# Patient Record
Sex: MALE | NOT HISPANIC OR LATINO
[De-identification: names, ages, dates, MRNs, and addresses within clinical notes are randomized per-mention and may not be internally consistent; named-entity substitution may affect disease eponyms.]

---

## 2022-01-12 PROBLEM — Z00.00 ENCOUNTER FOR PREVENTIVE HEALTH EXAMINATION: Status: ACTIVE | Noted: 2022-01-12

## 2023-09-27 ENCOUNTER — LABORATORY RESULT (OUTPATIENT)
Age: 88
End: 2023-09-27

## 2023-09-27 ENCOUNTER — APPOINTMENT (OUTPATIENT)
Dept: FAMILY MEDICINE | Facility: CLINIC | Age: 88
End: 2023-09-27
Payer: MEDICARE

## 2023-09-27 ENCOUNTER — NON-APPOINTMENT (OUTPATIENT)
Age: 88
End: 2023-09-27

## 2023-09-27 VITALS
OXYGEN SATURATION: 87 % | HEART RATE: 79 BPM | TEMPERATURE: 97.6 F | BODY MASS INDEX: 30.31 KG/M2 | WEIGHT: 200 LBS | HEIGHT: 68 IN

## 2023-09-27 DIAGNOSIS — Z13.0 ENCOUNTER FOR SCREENING FOR OTHER SUSPECTED ENDOCRINE DISORDER: ICD-10-CM

## 2023-09-27 DIAGNOSIS — Z13.21 ENCOUNTER FOR SCREENING FOR OTHER SUSPECTED ENDOCRINE DISORDER: ICD-10-CM

## 2023-09-27 DIAGNOSIS — I50.9 HEART FAILURE, UNSPECIFIED: ICD-10-CM

## 2023-09-27 DIAGNOSIS — Z13.29 ENCOUNTER FOR SCREENING FOR OTHER SUSPECTED ENDOCRINE DISORDER: ICD-10-CM

## 2023-09-27 DIAGNOSIS — Z12.5 ENCOUNTER FOR SCREENING FOR MALIGNANT NEOPLASM OF PROSTATE: ICD-10-CM

## 2023-09-27 DIAGNOSIS — Z98.890 OTHER SPECIFIED POSTPROCEDURAL STATES: ICD-10-CM

## 2023-09-27 DIAGNOSIS — Z13.228 ENCOUNTER FOR SCREENING FOR OTHER SUSPECTED ENDOCRINE DISORDER: ICD-10-CM

## 2023-09-27 PROCEDURE — G0439: CPT

## 2023-09-27 PROCEDURE — 36415 COLL VENOUS BLD VENIPUNCTURE: CPT

## 2023-09-27 RX ORDER — TAMSULOSIN HYDROCHLORIDE 0.4 MG/1
0.4 CAPSULE ORAL
Qty: 90 | Refills: 0 | Status: ACTIVE | COMMUNITY
Start: 2023-09-27 | End: 1900-01-01

## 2023-09-27 RX ORDER — CLOPIDOGREL BISULFATE 75 MG/1
75 TABLET, FILM COATED ORAL DAILY
Qty: 90 | Refills: 0 | Status: ACTIVE | COMMUNITY
Start: 2023-09-27 | End: 1900-01-01

## 2023-09-27 RX ORDER — CHLORHEXIDINE GLUCONATE 4 %
325 (65 FE) LIQUID (ML) TOPICAL
Qty: 100 | Refills: 0 | Status: ACTIVE | COMMUNITY
Start: 2023-09-27 | End: 1900-01-01

## 2023-09-27 RX ORDER — POTASSIUM CHLORIDE 1500 MG/1
20 TABLET, FILM COATED, EXTENDED RELEASE ORAL
Qty: 90 | Refills: 0 | Status: ACTIVE | COMMUNITY
Start: 2023-09-27 | End: 1900-01-01

## 2023-09-27 RX ORDER — SIMVASTATIN 40 MG/1
40 TABLET, FILM COATED ORAL
Qty: 90 | Refills: 0 | Status: ACTIVE | COMMUNITY
Start: 2023-09-27 | End: 1900-01-01

## 2023-09-27 RX ORDER — FUROSEMIDE 40 MG/1
40 TABLET ORAL DAILY
Qty: 90 | Refills: 0 | Status: ACTIVE | COMMUNITY
Start: 2023-09-27 | End: 1900-01-01

## 2023-10-09 ENCOUNTER — NON-APPOINTMENT (OUTPATIENT)
Age: 88
End: 2023-10-09

## 2023-12-22 ENCOUNTER — RX RENEWAL (OUTPATIENT)
Age: 88
End: 2023-12-22

## 2023-12-28 ENCOUNTER — RX RENEWAL (OUTPATIENT)
Age: 88
End: 2023-12-28

## 2024-02-18 ENCOUNTER — INPATIENT (INPATIENT)
Facility: HOSPITAL | Age: 89
LOS: 0 days | DRG: 291 | End: 2024-02-19
Attending: STUDENT IN AN ORGANIZED HEALTH CARE EDUCATION/TRAINING PROGRAM | Admitting: STUDENT IN AN ORGANIZED HEALTH CARE EDUCATION/TRAINING PROGRAM
Payer: MEDICARE

## 2024-02-18 VITALS
HEIGHT: 65 IN | RESPIRATION RATE: 20 BRPM | WEIGHT: 195.11 LBS | DIASTOLIC BLOOD PRESSURE: 78 MMHG | TEMPERATURE: 98 F | HEART RATE: 77 BPM | OXYGEN SATURATION: 89 % | SYSTOLIC BLOOD PRESSURE: 131 MMHG

## 2024-02-18 DIAGNOSIS — I50.9 HEART FAILURE, UNSPECIFIED: ICD-10-CM

## 2024-02-18 LAB
ALBUMIN SERPL ELPH-MCNC: 3.4 G/DL — SIGNIFICANT CHANGE UP (ref 3.3–5.2)
ALP SERPL-CCNC: 82 U/L — SIGNIFICANT CHANGE UP (ref 40–120)
ALT FLD-CCNC: 13 U/L — SIGNIFICANT CHANGE UP
ANION GAP SERPL CALC-SCNC: 10 MMOL/L — SIGNIFICANT CHANGE UP (ref 5–17)
APPEARANCE UR: CLEAR — SIGNIFICANT CHANGE UP
APTT BLD: 32.6 SEC — SIGNIFICANT CHANGE UP (ref 24.5–35.6)
AST SERPL-CCNC: 21 U/L — SIGNIFICANT CHANGE UP
BACTERIA # UR AUTO: ABNORMAL /HPF
BASOPHILS # BLD AUTO: 0.03 K/UL — SIGNIFICANT CHANGE UP (ref 0–0.2)
BASOPHILS NFR BLD AUTO: 0.3 % — SIGNIFICANT CHANGE UP (ref 0–2)
BILIRUB SERPL-MCNC: 0.5 MG/DL — SIGNIFICANT CHANGE UP (ref 0.4–2)
BILIRUB UR-MCNC: NEGATIVE — SIGNIFICANT CHANGE UP
BUN SERPL-MCNC: 37 MG/DL — HIGH (ref 8–20)
CALCIUM SERPL-MCNC: 8.6 MG/DL — SIGNIFICANT CHANGE UP (ref 8.4–10.5)
CAST: 11 /LPF — HIGH (ref 0–4)
CHLORIDE SERPL-SCNC: 96 MMOL/L — SIGNIFICANT CHANGE UP (ref 96–108)
CO2 SERPL-SCNC: 37 MMOL/L — HIGH (ref 22–29)
COLOR SPEC: YELLOW — SIGNIFICANT CHANGE UP
CREAT SERPL-MCNC: 2.28 MG/DL — HIGH (ref 0.5–1.3)
DIFF PNL FLD: NEGATIVE — SIGNIFICANT CHANGE UP
EGFR: 26 ML/MIN/1.73M2 — LOW
EOSINOPHIL # BLD AUTO: 0.08 K/UL — SIGNIFICANT CHANGE UP (ref 0–0.5)
EOSINOPHIL NFR BLD AUTO: 0.8 % — SIGNIFICANT CHANGE UP (ref 0–6)
FLUAV AG NPH QL: SIGNIFICANT CHANGE UP
FLUBV AG NPH QL: SIGNIFICANT CHANGE UP
GAS PNL BLDA: SIGNIFICANT CHANGE UP
GAS PNL BLDA: SIGNIFICANT CHANGE UP
GLUCOSE SERPL-MCNC: 135 MG/DL — HIGH (ref 70–99)
GLUCOSE UR QL: NEGATIVE MG/DL — SIGNIFICANT CHANGE UP
HCT VFR BLD CALC: 31.8 % — LOW (ref 39–50)
HGB BLD-MCNC: 9.8 G/DL — LOW (ref 13–17)
IMM GRANULOCYTES NFR BLD AUTO: 0.4 % — SIGNIFICANT CHANGE UP (ref 0–0.9)
INR BLD: 1.16 RATIO — SIGNIFICANT CHANGE UP (ref 0.85–1.18)
KETONES UR-MCNC: NEGATIVE MG/DL — SIGNIFICANT CHANGE UP
LEUKOCYTE ESTERASE UR-ACNC: NEGATIVE — SIGNIFICANT CHANGE UP
LYMPHOCYTES # BLD AUTO: 1.75 K/UL — SIGNIFICANT CHANGE UP (ref 1–3.3)
LYMPHOCYTES # BLD AUTO: 17.8 % — SIGNIFICANT CHANGE UP (ref 13–44)
MAGNESIUM SERPL-MCNC: 1.9 MG/DL — SIGNIFICANT CHANGE UP (ref 1.6–2.6)
MCHC RBC-ENTMCNC: 30.1 PG — SIGNIFICANT CHANGE UP (ref 27–34)
MCHC RBC-ENTMCNC: 30.8 GM/DL — LOW (ref 32–36)
MCV RBC AUTO: 97.5 FL — SIGNIFICANT CHANGE UP (ref 80–100)
MONOCYTES # BLD AUTO: 0.79 K/UL — SIGNIFICANT CHANGE UP (ref 0–0.9)
MONOCYTES NFR BLD AUTO: 8 % — SIGNIFICANT CHANGE UP (ref 2–14)
NEUTROPHILS # BLD AUTO: 7.14 K/UL — SIGNIFICANT CHANGE UP (ref 1.8–7.4)
NEUTROPHILS NFR BLD AUTO: 72.7 % — SIGNIFICANT CHANGE UP (ref 43–77)
NITRITE UR-MCNC: NEGATIVE — SIGNIFICANT CHANGE UP
PH UR: 5.5 — SIGNIFICANT CHANGE UP (ref 5–8)
PHOSPHATE SERPL-MCNC: 4.5 MG/DL — SIGNIFICANT CHANGE UP (ref 2.4–4.7)
PLATELET # BLD AUTO: 227 K/UL — SIGNIFICANT CHANGE UP (ref 150–400)
POTASSIUM SERPL-MCNC: 3.8 MMOL/L — SIGNIFICANT CHANGE UP (ref 3.5–5.3)
POTASSIUM SERPL-SCNC: 3.8 MMOL/L — SIGNIFICANT CHANGE UP (ref 3.5–5.3)
PROCALCITONIN SERPL-MCNC: 0.1 NG/ML — SIGNIFICANT CHANGE UP (ref 0.02–0.1)
PROT SERPL-MCNC: 6.9 G/DL — SIGNIFICANT CHANGE UP (ref 6.6–8.7)
PROT UR-MCNC: 30 MG/DL
PROTHROM AB SERPL-ACNC: 12.8 SEC — SIGNIFICANT CHANGE UP (ref 9.5–13)
RBC # BLD: 3.26 M/UL — LOW (ref 4.2–5.8)
RBC # FLD: 12.5 % — SIGNIFICANT CHANGE UP (ref 10.3–14.5)
RBC CASTS # UR COMP ASSIST: 1 /HPF — SIGNIFICANT CHANGE UP (ref 0–4)
RSV RNA NPH QL NAA+NON-PROBE: SIGNIFICANT CHANGE UP
SARS-COV-2 RNA SPEC QL NAA+PROBE: SIGNIFICANT CHANGE UP
SODIUM SERPL-SCNC: 143 MMOL/L — SIGNIFICANT CHANGE UP (ref 135–145)
SP GR SPEC: 1.02 — SIGNIFICANT CHANGE UP (ref 1–1.03)
SQUAMOUS # UR AUTO: 2 /HPF — SIGNIFICANT CHANGE UP (ref 0–5)
TROPONIN T, HIGH SENSITIVITY RESULT: 71 NG/L — HIGH (ref 0–51)
TROPONIN T, HIGH SENSITIVITY RESULT: 72 NG/L — HIGH (ref 0–51)
UROBILINOGEN FLD QL: 1 MG/DL — SIGNIFICANT CHANGE UP (ref 0.2–1)
WBC # BLD: 9.83 K/UL — SIGNIFICANT CHANGE UP (ref 3.8–10.5)
WBC # FLD AUTO: 9.83 K/UL — SIGNIFICANT CHANGE UP (ref 3.8–10.5)
WBC UR QL: 0 /HPF — SIGNIFICANT CHANGE UP (ref 0–5)

## 2024-02-18 PROCEDURE — 93010 ELECTROCARDIOGRAM REPORT: CPT

## 2024-02-18 PROCEDURE — 71045 X-RAY EXAM CHEST 1 VIEW: CPT | Mod: 26

## 2024-02-18 PROCEDURE — 99291 CRITICAL CARE FIRST HOUR: CPT

## 2024-02-18 PROCEDURE — 99223 1ST HOSP IP/OBS HIGH 75: CPT

## 2024-02-18 RX ORDER — NITROGLYCERIN 6.5 MG
0.4 CAPSULE, EXTENDED RELEASE ORAL ONCE
Refills: 0 | Status: COMPLETED | OUTPATIENT
Start: 2024-02-18 | End: 2024-02-18

## 2024-02-18 RX ORDER — FINASTERIDE 5 MG/1
5 TABLET, FILM COATED ORAL DAILY
Refills: 0 | Status: DISCONTINUED | OUTPATIENT
Start: 2024-02-18 | End: 2024-02-19

## 2024-02-18 RX ORDER — ATORVASTATIN CALCIUM 80 MG/1
40 TABLET, FILM COATED ORAL AT BEDTIME
Refills: 0 | Status: DISCONTINUED | OUTPATIENT
Start: 2024-02-18 | End: 2024-02-19

## 2024-02-18 RX ORDER — ASPIRIN/CALCIUM CARB/MAGNESIUM 324 MG
81 TABLET ORAL DAILY
Refills: 0 | Status: DISCONTINUED | OUTPATIENT
Start: 2024-02-18 | End: 2024-02-19

## 2024-02-18 RX ORDER — ASPIRIN/CALCIUM CARB/MAGNESIUM 324 MG
162 TABLET ORAL ONCE
Refills: 0 | Status: COMPLETED | OUTPATIENT
Start: 2024-02-18 | End: 2024-02-18

## 2024-02-18 RX ORDER — CLOPIDOGREL BISULFATE 75 MG/1
75 TABLET, FILM COATED ORAL DAILY
Refills: 0 | Status: DISCONTINUED | OUTPATIENT
Start: 2024-02-18 | End: 2024-02-19

## 2024-02-18 RX ORDER — HEPARIN SODIUM 5000 [USP'U]/ML
5000 INJECTION INTRAVENOUS; SUBCUTANEOUS EVERY 8 HOURS
Refills: 0 | Status: DISCONTINUED | OUTPATIENT
Start: 2024-02-18 | End: 2024-02-19

## 2024-02-18 RX ORDER — AZITHROMYCIN 500 MG/1
500 TABLET, FILM COATED ORAL ONCE
Refills: 0 | Status: COMPLETED | OUTPATIENT
Start: 2024-02-18 | End: 2024-02-18

## 2024-02-18 RX ORDER — ACETAMINOPHEN 500 MG
650 TABLET ORAL EVERY 6 HOURS
Refills: 0 | Status: DISCONTINUED | OUTPATIENT
Start: 2024-02-18 | End: 2024-02-19

## 2024-02-18 RX ORDER — ONDANSETRON 8 MG/1
4 TABLET, FILM COATED ORAL EVERY 8 HOURS
Refills: 0 | Status: DISCONTINUED | OUTPATIENT
Start: 2024-02-18 | End: 2024-02-19

## 2024-02-18 RX ORDER — AZITHROMYCIN 500 MG/1
500 TABLET, FILM COATED ORAL EVERY 24 HOURS
Refills: 0 | Status: DISCONTINUED | OUTPATIENT
Start: 2024-02-18 | End: 2024-02-19

## 2024-02-18 RX ORDER — FERROUS SULFATE 325(65) MG
325 TABLET ORAL DAILY
Refills: 0 | Status: DISCONTINUED | OUTPATIENT
Start: 2024-02-18 | End: 2024-02-19

## 2024-02-18 RX ORDER — FUROSEMIDE 40 MG
40 TABLET ORAL ONCE
Refills: 0 | Status: COMPLETED | OUTPATIENT
Start: 2024-02-18 | End: 2024-02-18

## 2024-02-18 RX ORDER — INFLUENZA VIRUS VACCINE 15; 15; 15; 15 UG/.5ML; UG/.5ML; UG/.5ML; UG/.5ML
0.7 SUSPENSION INTRAMUSCULAR ONCE
Refills: 0 | Status: DISCONTINUED | OUTPATIENT
Start: 2024-02-18 | End: 2024-02-19

## 2024-02-18 RX ORDER — LANOLIN ALCOHOL/MO/W.PET/CERES
3 CREAM (GRAM) TOPICAL AT BEDTIME
Refills: 0 | Status: DISCONTINUED | OUTPATIENT
Start: 2024-02-18 | End: 2024-02-19

## 2024-02-18 RX ORDER — IPRATROPIUM/ALBUTEROL SULFATE 18-103MCG
3 AEROSOL WITH ADAPTER (GRAM) INHALATION ONCE
Refills: 0 | Status: COMPLETED | OUTPATIENT
Start: 2024-02-18 | End: 2024-02-18

## 2024-02-18 RX ORDER — TAMSULOSIN HYDROCHLORIDE 0.4 MG/1
0.4 CAPSULE ORAL AT BEDTIME
Refills: 0 | Status: DISCONTINUED | OUTPATIENT
Start: 2024-02-18 | End: 2024-02-19

## 2024-02-18 RX ORDER — BUDESONIDE, MICRONIZED 100 %
0.5 POWDER (GRAM) MISCELLANEOUS
Refills: 0 | Status: DISCONTINUED | OUTPATIENT
Start: 2024-02-18 | End: 2024-02-19

## 2024-02-18 RX ORDER — FUROSEMIDE 40 MG
40 TABLET ORAL
Refills: 0 | Status: DISCONTINUED | OUTPATIENT
Start: 2024-02-18 | End: 2024-02-19

## 2024-02-18 RX ORDER — IPRATROPIUM/ALBUTEROL SULFATE 18-103MCG
3 AEROSOL WITH ADAPTER (GRAM) INHALATION EVERY 6 HOURS
Refills: 0 | Status: DISCONTINUED | OUTPATIENT
Start: 2024-02-18 | End: 2024-02-19

## 2024-02-18 RX ADMIN — Medication 40 MILLIGRAM(S): at 12:47

## 2024-02-18 RX ADMIN — AZITHROMYCIN 255 MILLIGRAM(S): 500 TABLET, FILM COATED ORAL at 12:47

## 2024-02-18 RX ADMIN — Medication 40 MILLIGRAM(S): at 13:49

## 2024-02-18 RX ADMIN — Medication 3 MILLILITER(S): at 21:55

## 2024-02-18 RX ADMIN — Medication 162 MILLIGRAM(S): at 13:40

## 2024-02-18 RX ADMIN — HEPARIN SODIUM 5000 UNIT(S): 5000 INJECTION INTRAVENOUS; SUBCUTANEOUS at 21:51

## 2024-02-18 RX ADMIN — Medication 3 MILLILITER(S): at 13:40

## 2024-02-18 RX ADMIN — Medication 40 MILLIGRAM(S): at 21:52

## 2024-02-18 RX ADMIN — Medication 3 MILLILITER(S): at 12:47

## 2024-02-18 RX ADMIN — AZITHROMYCIN 255 MILLIGRAM(S): 500 TABLET, FILM COATED ORAL at 21:57

## 2024-02-18 RX ADMIN — TAMSULOSIN HYDROCHLORIDE 0.4 MILLIGRAM(S): 0.4 CAPSULE ORAL at 21:50

## 2024-02-18 RX ADMIN — Medication 0.4 MILLIGRAM(S): at 13:49

## 2024-02-18 NOTE — ED ADULT NURSE REASSESSMENT NOTE - NS ED NURSE REASSESS COMMENT FT1
Pt seen to desat on nonrebreather. MD Mcguire at bedside. Pt seen to desat on nonrebreather. MD Mcguire at bedside. Medications given as prescribed

## 2024-02-18 NOTE — ED ADULT NURSE REASSESSMENT NOTE - NS ED NURSE REASSESS COMMENT FT1
Pt. on NRBM, tripoding, continuing to express SOB. MD aware, pt. placed on BiPAP, repositioned, moved to L sided monitor spot.

## 2024-02-18 NOTE — H&P ADULT - TIME BILLING
Labs/Imaging/Notes reviewed. Orders placed. MICU consulted by ED. Cardio consulted. Pulm consulted for AVAPS.

## 2024-02-18 NOTE — ED PROVIDER NOTE - CRITICAL CARE ATTENDING CONTRIBUTION TO CARE
emergent meds, had to transition to bipap, frequent reassessments/interventions, d/w consulting cardiology service / hospitalist

## 2024-02-18 NOTE — ED ADULT NURSE NOTE - NSFALLHARMRISKINTERV_ED_ALL_ED

## 2024-02-18 NOTE — H&P ADULT - ASSESSMENT
ASSSESSMENT:  89M with PMHX COPD on 2.5L Home O2, CAD, HTN, HLD, JOSE, CKD2, BPH BIBEMS to [facility] ER after being unable to get up from toilet seat due to generalized weakness admitted for Acute on Chronic Hypoxic and Hypercapnic Respiratory Failure 2/2 Acute COPD Exacerbation and Acute on Chronic CHF Exacerbation, ARF on CKD, and elevated Troponin.    PLAN:  Acute on Chronic Hypoxic and Hypercapnic Respiratory Failure 2/2 Acute COPD Exacerbation and Acute on Chronic CHF Exacerbation  -Admit to SDU  -ABG 7.26/101/45 -> Repeat ABG 7.35/83/46  -Continue NIPPV BIPAP/AVAPS   -Repeat ABG in AM  -CXR +Atelectasis +TDS  -CT Chest WO to evaluate LLL -> pending  -COVID/RVP Negative  -Duoneb q6 ATC  -Budesonide BID  -Solumedrol 40mg IV q8  -Azithromycin 500mg q24  -Lasix 40mg IV BID  -MICU Consulted by ED  -Pulm Consulted    Elevated Troponin  -Likely type 2 from demand in setting of hypoxia/hypercapnea and CHF  -EKG LAFB   -Monitor Tele  -Trend Cardiac Enzymes  -Check TTE  -SLN x1 given for elevated BP  -ASA 81mg q24  -Cardiology Consulted ([facility])    ARF on CKD  -Hx CKD2 with baseline Cr 1.2  -Permissive Azotemia for diuresis  -Avoid Nephrotoxins  -Renal dose meds  -Trend BMP  -Monitor UOP    CAD, HTN, HLD  -ASA 81mg q24 + Plavix 75mg q24  -Simvastatin 40mg q24  -Propranolol ER 80mg q24  -Lasix 40mg q24 -> IV Diuresis above    JOSE  -Ferrous Sulfate 325mg PO q24    BPH  -Flomax 0.4mg qHS  -Finasteride 5mg q24  -Bladder Scan x1 notify if >350cc    Advanced Care Planning  -DNR/DNI per ED. MOLST completed in ED.    Dispo  -Daughter concerned about patient living alone  -PT and SW evaluation

## 2024-02-18 NOTE — ED ADULT NURSE REASSESSMENT NOTE - NS ED NURSE REASSESS COMMENT FT1
assumed care of this patient at 1920 hours. discussed all medical information with the off going nurse. This patient is resting comfortably in bed no apparent distress noted at this time. Air breathing circulation WNL. Denies pain. Pt remains on bedside monitor. This nurse will continue to monitor.

## 2024-02-18 NOTE — H&P ADULT - HISTORY OF PRESENT ILLNESS
89M with PMHX COPD on 2.5L Home O2, CAD, HTN, HLD, JOSE, CKD2, BPH BIBEMS to [facility] ER after being unable to get up from toilet seat due to generalized weakness. Daughter at bedside stated that he has been having difficulty breathing and lethargy. ABG 7.27 with PCO2 >100. Patient placed on BIPAP and AVAPS. Repeat ABG with improvement. Diffuse wheezing and rales on exam. Treated with Duonebs/Steroids/Lasix with improvement. Daughter concerned about patient living alone in [facility] apartment. Also confirmed DNR/DNI and MOLST completed by ED. Baseline Cr 1.2 currently >2.2 on admission. CXR with atelectasis but TDS. Troponin spill on labs noted for which he was given ASA. EKG with LAFB. No other complaints. AMS/Lethargy improving on BIPAP.     ROS limited 2/2 Hypercapnea/AMS but negative unless otherwise stated.    PMHX: COPD on 2.5L Home O2, CAD, HTN, HLD, JOSE, CKD2, BPH  PSHX: Unable to obtain  FamHx: Unable to obtain  Social Hx: Former Smoker

## 2024-02-18 NOTE — ED ADULT TRIAGE NOTE - CHIEF COMPLAINT QUOTE
" I wasn't able to get up from the toilet seat" as per EMS family states he walked to the bathroom at 0900 and then was unable to get up, pt having generalized weakness. No family at bedside. pt AOX4, pt has hx of COPD on 2.5 L home o2

## 2024-02-18 NOTE — ED ADULT NURSE NOTE - OBJECTIVE STATEMENT
Pt. received alert and oriented to self, place, and time. Pt. states he is unsure why exactly he is in hospital, says family was having difficult time getting him up from toilet. Pt. uses walker at baseline. Pt. appears LICEA, on NC currently. Pt. denies other symptoms, denies fall.

## 2024-02-18 NOTE — ED ADULT NURSE REASSESSMENT NOTE - NS ED NURSE REASSESS COMMENT FT1
Pt. appears comfortable on BiPAP, getting good volumes on BiPAP monitor, awake and oriented x4. Pt. appears less agitated, resting in stretcher comfortably.

## 2024-02-18 NOTE — ED PROVIDER NOTE - CLINICAL SUMMARY MEDICAL DECISION MAKING FREE TEXT BOX
89M hx CHF, CKD (stage 2-3a), presenting for evaluation of SOB and fatigue, on exam with 2+ pitting edema BLE also with diffuse inspiratory and expiratory wheezing. Suspect both COPD / CHF exacerbation, will start meds, check labs, ekg w. nsr rbbb / lafb -> no st e/d or i [facility] no olds, will d/w cards, follow up studies, reassess, dispo.

## 2024-02-18 NOTE — ED PROVIDER NOTE - CARE PLAN
1 Principal Discharge DX:	Acute CHF  Secondary Diagnosis:	COPD exacerbation  Secondary Diagnosis:	Acute kidney injury superimposed on CKD  Secondary Diagnosis:	Elevated troponin

## 2024-02-18 NOTE — H&P ADULT - NSHPLABSRESULTS_GEN_ALL_CORE
CXR IMPRESSION: Limited evaluation of the heart size, mediastinal and hilar   contours due to the low lung volumes. There are linear bands of   atelectasis involving the mid and lower lung zones which appear more   confluent over the left lower lung zone and a developing pneumonia cannot   be excluded. No large effusions. No pneumothorax. No acute soft tissue or   osseous pathology. A short surveillance 2 view chest may be of value to   confirm resolution of a possible developing left lower lobe pneumonia.

## 2024-02-18 NOTE — CHART NOTE - NSCHARTNOTEFT_GEN_A_CORE
Critical Care Brief Note:    MICU consulted for hypercapnea  patient is 89 y/o M with hx of likely COPD present with SOB and AMS, initially lethargic.  Blood gas with PCO2 102, started on bipap.   Further GOC with family per ED team was DNI/DNR  after patient placed on bipap and later bipap, patient mentation improved.   Does not need ICU at this time, can continue further workup and management on the floor.  Recommend pulm consult.

## 2024-02-18 NOTE — ED ADULT NURSE REASSESSMENT NOTE - NS ED NURSE REASSESS COMMENT FT1
this patient wanted off of bipap. this nurse placed pt on NC O2  6L . pt spo2 95% Informed Respiratory Therapist about pt not wanting bipap. this nurse will continue to monitor the patient pt remains on cardiac monitor and spo2 monitor

## 2024-02-18 NOTE — ED PROVIDER NOTE - OBJECTIVE STATEMENT
Patient is a 89 YOM with PMHx COPD presenting with generalized weakness, daughter at bedside. Today patient was unable to stand and ambulate to bathroom, called his daughter for help. Patient has been experiencing recent, difficulty breathing, baseline oxygenation with at home oxygen nasal cannula. Daughter shares that patient lives alone in [facility] apartment, visited by a helper 3x/week for showering, maintenance, calls family for other needs. He sleeps in a reclining chair, baseline ambulation poor, has fallen in the past due to unsteadiness. Daughter reports concerns about patient living alone, unable to care for himself without more assistance.

## 2024-02-18 NOTE — H&P ADULT - NSHPPHYSICALEXAM_GEN_ALL_CORE
Vital Signs Last 24 Hrs  T(C): 36.3 (18 Feb 2024 15:25), Max: 36.6 (18 Feb 2024 11:05)  T(F): 97.4 (18 Feb 2024 15:25), Max: 97.8 (18 Feb 2024 11:05)  HR: 78 (18 Feb 2024 19:21) (76 - 102)  BP: 128/70 (18 Feb 2024 19:21) (120/58 - 165/97)  BP(mean): --  RR: 20 (18 Feb 2024 19:21) (20 - 20)  SpO2: 93% (18 Feb 2024 19:21) (70% - 98%)    Parameters below as of 18 Feb 2024 19:21  Patient On (Oxygen Delivery Method): BiPAP/CPAP    Constitutional: NAD, VSS  Head: NC/AT+BIPAP AVAPS in place  Eyes: PERRL, EOMI, anicteric sclera, conjunctiva WNL  ENT: Normal Pharynx, MMM, No tonsillar exudate/erythema  Neck: Supple, Non-tender  Chest: Non-tender, no rashes  Cardio: RRR, s1/s2, no appreciable murmurs/rubs/gallops  Resp: +BL Inspiratory/Expiratory Wheezing +Bibasilar Rales  Abd: Soft, Non-tender, Non-distended, no rebound/guarding/rigidity  : not examined  Rectal: not examined  MSK: moving all extremities, no motor weakness, full ROM x4  Ext: palpable distal pulses, good capillary refill  Psych: Mildly confused, cooperative  Neuro: CN II-XII grossly intact, no focal deficits  Skin: Warm/Dry. No rashes.

## 2024-02-18 NOTE — ED PROVIDER NOTE - PHYSICAL EXAMINATION
GENERAL: A&Ox3, no acute distress, comfortably in bed  HEENT:  Atraumatic, normocephalic, non-icteric sclera, no cervical LAD, neck supple, no JVD, thyroid normal  NEURO/PSYCH:  No focal deficits, normal affect, strength 5/5 all 4 extremities  LUNGS: +diffuze wheezing, SOB  HEART: RRR, no murmur appreciated  ABD: Soft, non-tender, non-distended, no organomegaly, no appreciable masses, +bs all 4 quadrants  EXTREMITIES:  +bilateral pitting edema  SKIN: +suppurative bilateral lower extremities

## 2024-02-18 NOTE — H&P ADULT - REASON FOR ADMISSION
Acute on Chronic Hypoxic and Hypercapnic Respiratory Failure 2/2 Acute COPD Exacerbation and Acute on Chronic CHF Exacerbation

## 2024-02-19 ENCOUNTER — RESULT REVIEW (OUTPATIENT)
Age: 89
End: 2024-02-19

## 2024-02-19 VITALS — HEART RATE: 105 BPM | OXYGEN SATURATION: 99 %

## 2024-02-19 LAB
ALBUMIN SERPL ELPH-MCNC: 3 G/DL — LOW (ref 3.3–5.2)
ALP SERPL-CCNC: 76 U/L — SIGNIFICANT CHANGE UP (ref 40–120)
ALT FLD-CCNC: 14 U/L — SIGNIFICANT CHANGE UP
ANION GAP SERPL CALC-SCNC: 14 MMOL/L — SIGNIFICANT CHANGE UP (ref 5–17)
AST SERPL-CCNC: 34 U/L — SIGNIFICANT CHANGE UP
BASE EXCESS BLDA CALC-SCNC: 21 MMOL/L — HIGH (ref -2–3)
BILIRUB SERPL-MCNC: 0.4 MG/DL — SIGNIFICANT CHANGE UP (ref 0.4–2)
BLOOD GAS COMMENTS ARTERIAL: SIGNIFICANT CHANGE UP
BUN SERPL-MCNC: 42.2 MG/DL — HIGH (ref 8–20)
CALCIUM SERPL-MCNC: 8.6 MG/DL — SIGNIFICANT CHANGE UP (ref 8.4–10.5)
CHLORIDE SERPL-SCNC: 93 MMOL/L — LOW (ref 96–108)
CO2 SERPL-SCNC: 33 MMOL/L — HIGH (ref 22–29)
CREAT SERPL-MCNC: 2.2 MG/DL — HIGH (ref 0.5–1.3)
EGFR: 27 ML/MIN/1.73M2 — LOW
GAS PNL BLDA: SIGNIFICANT CHANGE UP
GLUCOSE SERPL-MCNC: 153 MG/DL — HIGH (ref 70–99)
HCO3 BLDA-SCNC: 46 MMOL/L — CRITICAL HIGH (ref 21–28)
HCT VFR BLD CALC: 28.9 % — LOW (ref 39–50)
HGB BLD-MCNC: 8.9 G/DL — LOW (ref 13–17)
HOROWITZ INDEX BLDA+IHG-RTO: 40 — SIGNIFICANT CHANGE UP
MAGNESIUM SERPL-MCNC: 1.9 MG/DL — SIGNIFICANT CHANGE UP (ref 1.6–2.6)
MCHC RBC-ENTMCNC: 29.7 PG — SIGNIFICANT CHANGE UP (ref 27–34)
MCHC RBC-ENTMCNC: 30.8 GM/DL — LOW (ref 32–36)
MCV RBC AUTO: 96.3 FL — SIGNIFICANT CHANGE UP (ref 80–100)
PCO2 BLDA: 78 MMHG — CRITICAL HIGH (ref 35–48)
PH BLDA: 7.38 — SIGNIFICANT CHANGE UP (ref 7.35–7.45)
PHOSPHATE SERPL-MCNC: 4.2 MG/DL — SIGNIFICANT CHANGE UP (ref 2.4–4.7)
PLATELET # BLD AUTO: 217 K/UL — SIGNIFICANT CHANGE UP (ref 150–400)
PO2 BLDA: 87 MMHG — SIGNIFICANT CHANGE UP (ref 83–108)
POTASSIUM SERPL-MCNC: 4.2 MMOL/L — SIGNIFICANT CHANGE UP (ref 3.5–5.3)
POTASSIUM SERPL-SCNC: 4.2 MMOL/L — SIGNIFICANT CHANGE UP (ref 3.5–5.3)
PROT SERPL-MCNC: 6.6 G/DL — SIGNIFICANT CHANGE UP (ref 6.6–8.7)
RBC # BLD: 3 M/UL — LOW (ref 4.2–5.8)
RBC # FLD: 12.6 % — SIGNIFICANT CHANGE UP (ref 10.3–14.5)
SAO2 % BLDA: 100 % — HIGH (ref 94–98)
SODIUM SERPL-SCNC: 140 MMOL/L — SIGNIFICANT CHANGE UP (ref 135–145)
TROPONIN T, HIGH SENSITIVITY RESULT: 186 NG/L — HIGH (ref 0–51)
WBC # BLD: 9.72 K/UL — SIGNIFICANT CHANGE UP (ref 3.8–10.5)
WBC # FLD AUTO: 9.72 K/UL — SIGNIFICANT CHANGE UP (ref 3.8–10.5)

## 2024-02-19 PROCEDURE — 93306 TTE W/DOPPLER COMPLETE: CPT | Mod: 26

## 2024-02-19 PROCEDURE — 85730 THROMBOPLASTIN TIME PARTIAL: CPT

## 2024-02-19 PROCEDURE — 84484 ASSAY OF TROPONIN QUANT: CPT

## 2024-02-19 PROCEDURE — 84132 ASSAY OF SERUM POTASSIUM: CPT

## 2024-02-19 PROCEDURE — 85018 HEMOGLOBIN: CPT

## 2024-02-19 PROCEDURE — 87637 SARSCOV2&INF A&B&RSV AMP PRB: CPT

## 2024-02-19 PROCEDURE — 84295 ASSAY OF SERUM SODIUM: CPT

## 2024-02-19 PROCEDURE — 96374 THER/PROPH/DIAG INJ IV PUSH: CPT

## 2024-02-19 PROCEDURE — 82330 ASSAY OF CALCIUM: CPT

## 2024-02-19 PROCEDURE — 94640 AIRWAY INHALATION TREATMENT: CPT

## 2024-02-19 PROCEDURE — 94660 CPAP INITIATION&MGMT: CPT

## 2024-02-19 PROCEDURE — 71250 CT THORAX DX C-: CPT | Mod: 26

## 2024-02-19 PROCEDURE — 85027 COMPLETE CBC AUTOMATED: CPT

## 2024-02-19 PROCEDURE — 80053 COMPREHEN METABOLIC PANEL: CPT

## 2024-02-19 PROCEDURE — 83880 ASSAY OF NATRIURETIC PEPTIDE: CPT

## 2024-02-19 PROCEDURE — 82803 BLOOD GASES ANY COMBINATION: CPT

## 2024-02-19 PROCEDURE — 82962 GLUCOSE BLOOD TEST: CPT

## 2024-02-19 PROCEDURE — 81001 URINALYSIS AUTO W/SCOPE: CPT

## 2024-02-19 PROCEDURE — 85025 COMPLETE CBC W/AUTO DIFF WBC: CPT

## 2024-02-19 PROCEDURE — 85014 HEMATOCRIT: CPT

## 2024-02-19 PROCEDURE — 93010 ELECTROCARDIOGRAM REPORT: CPT

## 2024-02-19 PROCEDURE — 96375 TX/PRO/DX INJ NEW DRUG ADDON: CPT

## 2024-02-19 PROCEDURE — 36415 COLL VENOUS BLD VENIPUNCTURE: CPT

## 2024-02-19 PROCEDURE — 71250 CT THORAX DX C-: CPT

## 2024-02-19 PROCEDURE — 83605 ASSAY OF LACTIC ACID: CPT

## 2024-02-19 PROCEDURE — 93005 ELECTROCARDIOGRAM TRACING: CPT

## 2024-02-19 PROCEDURE — 82435 ASSAY OF BLOOD CHLORIDE: CPT

## 2024-02-19 PROCEDURE — 99223 1ST HOSP IP/OBS HIGH 75: CPT

## 2024-02-19 PROCEDURE — C8929: CPT

## 2024-02-19 PROCEDURE — 99285 EMERGENCY DEPT VISIT HI MDM: CPT

## 2024-02-19 PROCEDURE — 84100 ASSAY OF PHOSPHORUS: CPT

## 2024-02-19 PROCEDURE — 85610 PROTHROMBIN TIME: CPT

## 2024-02-19 PROCEDURE — 83735 ASSAY OF MAGNESIUM: CPT

## 2024-02-19 PROCEDURE — 71045 X-RAY EXAM CHEST 1 VIEW: CPT

## 2024-02-19 PROCEDURE — 84145 PROCALCITONIN (PCT): CPT

## 2024-02-19 PROCEDURE — 82947 ASSAY GLUCOSE BLOOD QUANT: CPT

## 2024-02-19 PROCEDURE — 99232 SBSQ HOSP IP/OBS MODERATE 35: CPT

## 2024-02-19 RX ORDER — FUROSEMIDE 40 MG
40 TABLET ORAL DAILY
Refills: 0 | Status: DISCONTINUED | OUTPATIENT
Start: 2024-02-19 | End: 2024-02-19

## 2024-02-19 RX ORDER — METOPROLOL TARTRATE 50 MG
25 TABLET ORAL DAILY
Refills: 0 | Status: DISCONTINUED | OUTPATIENT
Start: 2024-02-19 | End: 2024-02-19

## 2024-02-19 RX ADMIN — Medication 0.5 MILLIGRAM(S): at 20:43

## 2024-02-19 RX ADMIN — Medication 40 MILLIGRAM(S): at 05:10

## 2024-02-19 RX ADMIN — HEPARIN SODIUM 5000 UNIT(S): 5000 INJECTION INTRAVENOUS; SUBCUTANEOUS at 14:14

## 2024-02-19 RX ADMIN — HEPARIN SODIUM 5000 UNIT(S): 5000 INJECTION INTRAVENOUS; SUBCUTANEOUS at 05:10

## 2024-02-19 RX ADMIN — Medication 3 MILLILITER(S): at 08:30

## 2024-02-19 RX ADMIN — CLOPIDOGREL BISULFATE 75 MILLIGRAM(S): 75 TABLET, FILM COATED ORAL at 11:02

## 2024-02-19 RX ADMIN — Medication 40 MILLIGRAM(S): at 14:14

## 2024-02-19 RX ADMIN — Medication 3 MILLILITER(S): at 05:15

## 2024-02-19 RX ADMIN — FINASTERIDE 5 MILLIGRAM(S): 5 TABLET, FILM COATED ORAL at 11:02

## 2024-02-19 RX ADMIN — Medication 3 MILLILITER(S): at 20:43

## 2024-02-19 RX ADMIN — Medication 325 MILLIGRAM(S): at 11:02

## 2024-02-19 RX ADMIN — Medication 0.5 MILLIGRAM(S): at 08:30

## 2024-02-19 RX ADMIN — Medication 3 MILLILITER(S): at 13:44

## 2024-02-19 RX ADMIN — Medication 81 MILLIGRAM(S): at 11:03

## 2024-02-19 NOTE — PROGRESS NOTE ADULT - SUBJECTIVE AND OBJECTIVE BOX
Chief complaint: SOB    Patient seen and examined at bedside. No acute overnight events reported. Patient states he is feeling well and is doing better. No fever, chills, cough, chest pain or shortness of breath.     Vital Signs Last 24 Hrs  T(F): 98.4 (19 Feb 2024 07:45), Max: 98.4 (19 Feb 2024 02:00)  HR: 85 (19 Feb 2024 07:45) (76 - 102)  BP: 122/55 (19 Feb 2024 07:45) (109/57 - 165/97)  RR: 18 (19 Feb 2024 07:45) (18 - 21)  SpO2: 99% (19 Feb 2024 08:40) (70% - 99%)    Physical Exam:  Constitutional: alert and oriented, in no acute distress   Neck: Soft and supple  Respiratory: Decreased breath sounds b/l  Cardiovascular: Regular rate and rhythm  Gastrointestinal: Soft, non-tender to palpation, +bs  Musculoskeletal: No lower extremity edema bilaterally    Labs:                        8.9    9.72  )-----------( 217      ( 19 Feb 2024 03:56 )             28.9   02-19    140  |  93<L>  |  42.2<H>  ----------------------------<  153<H>  4.2   |  33.0<H>  |  2.20<H>    Ca    8.6      19 Feb 2024 02:18  Phos  4.2     02-19  Mg     1.9     02-19    TPro  6.6  /  Alb  3.0<L>  /  TBili  0.4  /  DBili  x   /  AST  34  /  ALT  14  /  AlkPhos  76  02-19

## 2024-02-19 NOTE — PHYSICAL THERAPY INITIAL EVALUATION ADULT - BED MOBILITY LIMITATIONS, REHAB EVAL
Cortisone Injection    You have received a cortisone injection. The medication is a combination of a short acting anesthetic (numbing medication)  plus a steroid.     You may see some relief from the pain for several hours following the injection due to the numbing medication. Later that day or the next day you may have the same pain you had before or an increase in soreness. Swelling can also occur.  You may try a cold compress for 20 minutes on and 20 minutes off that day or over the counter medications with food (if not allergic)    Our expectation would be that you will improve on a day by day basis for the next several weeks or even longer.     Cortisone may cause a temporary (usually 2-3 days) increase in blood glucose (sugar) levels. If you have diabetes, please pay particular attention to this.     Please call the office if there are no signs of improvement after 4 weeks or if other problems develop such as an increase in swelling or redness. Our office number is [phone]    Thank you for allowing us to be of service to you.     [facility] orthopaedics.       
pt requires assist for transfer due to fatigue, pt declining to stand due to decreased activity tolerance despite encouragement

## 2024-02-19 NOTE — DISCHARGE NOTE FOR THE EXPIRED PATIENT - HOSPITAL COURSE
89M with PMHX COPD on 2.5L Home O2, CAD, HTN, HLD, JOSE, CKD2, BPH BIBEMS to [facility] ER after being unable to get up from toilet seat due to generalized weakness admitted for Acute on Chronic Hypoxic and Hypercapnic Respiratory Failure 2/2 Acute COPD Exacerbation and Acute on Chronic CHF Exacerbation, ARF on CKD, and elevated Troponin.

## 2024-02-19 NOTE — PHYSICAL THERAPY INITIAL EVALUATION ADULT - PERTINENT HX OF CURRENT PROBLEM, REHAB EVAL
Acute on Chronic Hypoxic and Hypercapnic Respiratory Failure 2/2 Acute COPD Exacerbation and Acute on Chronic CHF Exacerbation, ARF on CKD, and elevated Troponin.

## 2024-02-19 NOTE — PHYSICAL THERAPY INITIAL EVALUATION ADULT - NSPTDISCHREC_GEN_A_CORE
subacute rehab unless family willing to provide assistance at home, and then in that case will need home PT, stairs TBA

## 2024-02-19 NOTE — CHART NOTE - NSCHARTNOTEFT_GEN_A_CORE
Medicine KELSEY BARAKAT was cd @ 2057 for pt found unresponsive and bradycardic on monitor.  Pt quickly decelerated to roxi 20's with no pulse and code blue cd @ 2058, then cancelled.  Pt DNR/ DNI, spoke to pt's daughter Nubia Sanders confirming these directives.  Pt pronounced dead @ 2108, family notified, see discharge note.  Met with family bedside and all questions answered, emotional support given.

## 2024-02-19 NOTE — CONSULT NOTE ADULT - REASON FOR ADMISSION
Acute on Chronic Hypoxic and Hypercapnic Respiratory Failure 2/2 Acute COPD Exacerbation and Acute on Chronic CHF Exacerbation
Acute on Chronic Hypoxic and Hypercapnic Respiratory Failure 2/2 Acute COPD Exacerbation and Acute on Chronic CHF Exacerbation

## 2024-02-19 NOTE — PROGRESS NOTE ADULT - ASSESSMENT
89M with PMHX COPD on 2.5L Home O2, CAD, HTN, HLD, JOSE, CKD2, BPH BIBEMS to [facility] ER after being unable to get up from toilet seat due to generalized weakness admitted for Acute on Chronic Hypoxic and Hypercapnic Respiratory Failure 2/2 Acute COPD Exacerbation and Acute on Chronic CHF Exacerbation, ARF on CKD, and elevated Troponin.    Acute on Chronic Hypoxic and Hypercapnic Respiratory Failure 2/2 Acute COPD Exacerbation and Acute on Chronic CHF Exacerbation  - ABG 7.26/101/45 -> Repeat ABG 7.35/83/46  - Weaned off BiPAP  - Saturating well on 4L nasal cannula   - CXR +Atelectasis +TDS  - CT Chest WO to evaluate LLL   - COVID/RVP Negative  - Duoneb q6 ATC  - Budesonide BID  - Solumedrol 40mg IV q8  - Azithromycin 500mg q24  - Lasix 40mg IV daily  - Pulmonary consult appreciated    Elevated Troponin  - Likely type 2 from demand in setting of hypoxia/hypercapnea and CHF  - EKG LAFB   - Monitor Tele  - Trend Cardiac Enzymes  - TTE pending  - Aspirin 81mg daily  - Cardiology recs appreciated    ARF on CKD  - Hx CKD2 with baseline Cr 1.2  - Permissive Azotemia for diuresis  - Avoid Nephrotoxins  - Renal dose meds  - Trend BMP  - Monitor UOP    CAD, HTN, HLD  - ASA 81mg q24 + Plavix 75mg q24  - Simvastatin 40mg q24  - Propranolol ER 80mg q24  - Lasix decreased to 40mg daily    JOSE  - Ferrous Sulfate 325mg nightly    BPH  - Flomax 0.4mg nightly  - Finasteride 5mg nightly    Advanced Care Planning  -DNR/DNI per ED. MOLST completed in ED.    DVT ppx  - Heparin SQ

## 2024-02-19 NOTE — CONSULT NOTE ADULT - SUBJECTIVE AND OBJECTIVE BOX
Patient is a 89y old  Male who presents with a chief complaint of Acute on Chronic Hypoxic and Hypercapnic Respiratory Failure 2/2 Acute COPD Exacerbation and Acute on Chronic CHF Exacerbation (19 Feb 2024 10:44)      BRIEF HOSPITAL COURSE:   per HPI:   89M with PMHX COPD on 2.5L Home O2, CAD, HTN, HLD, JOSE, CKD2, BPH BIBEMS to [facility] ER after being unable to get up from toilet seat due to generalized weakness. Daughter at bedside stated that he has been having difficulty breathing and lethargy. ABG 7.27 with PCO2 >100. Patient placed on BIPAP and AVAPS. Repeat ABG with improvement. Diffuse wheezing and rales on exam. Treated with Duonebs/Steroids/Lasix with improvement. Daughter concerned about patient living alone in [facility] apartment. Also confirmed DNR/DNI and MOLST completed by ED. Baseline Cr 1.2 currently >2.2 on admission. CXR with atelectasis but TDS. Troponin spill on labs noted for which he was given ASA. EKG with LAFB. No other complaints. AMS/Lethargy improving on BIPAP.     Patient admitted to medicine for copd exacerbation     Spoke with daughter Nubia, patient lives by himself.    he did not use any bipap, he only use oxygen at home  he sees Dr. Lane at [place]   not sure what inhaler he use, but has albuterol     Today patient is more awake and alert, able to come off bipap and put on nasal canula at 4L.   Denies acute complaint, asking for food.       PAST MEDICAL & SURGICAL HISTORY:    Allergies    No Known Allergies    Intolerances      FAMILY HISTORY:      Family history otherwise noncontributory.    Social History:     Review of Systems:      ALL OTHER REVIEW OF SYSTEMS EXCEPT PER HPI NEGATIVE.      Medications:  azithromycin  IVPB 500 milliGRAM(s) IV Intermittent every 24 hours    furosemide   Injectable 40 milliGRAM(s) IV Push daily  metoprolol succinate ER 25 milliGRAM(s) Oral daily    albuterol/ipratropium for Nebulization 3 milliLiter(s) Nebulizer every 6 hours  buDESOnide    Inhalation Suspension 0.5 milliGRAM(s) Inhalation two times a day    acetaminophen     Tablet .. 650 milliGRAM(s) Oral every 6 hours PRN  melatonin 3 milliGRAM(s) Oral at bedtime PRN  ondansetron Injectable 4 milliGRAM(s) IV Push every 8 hours PRN      aspirin  chewable 81 milliGRAM(s) Oral daily  clopidogrel Tablet 75 milliGRAM(s) Oral daily  heparin   Injectable 5000 Unit(s) SubCutaneous every 8 hours    aluminum hydroxide/magnesium hydroxide/simethicone Suspension 30 milliLiter(s) Oral every 4 hours PRN    tamsulosin 0.4 milliGRAM(s) Oral at bedtime    atorvastatin 40 milliGRAM(s) Oral at bedtime  finasteride 5 milliGRAM(s) Oral daily  methylPREDNISolone sodium succinate Injectable 40 milliGRAM(s) IV Push every 8 hours    ferrous    sulfate 325 milliGRAM(s) Oral daily    influenza  Vaccine (HIGH DOSE) 0.7 milliLiter(s) IntraMuscular once        Vital Signs Last 24 Hrs  T(C): 36.7 (19 Feb 2024 14:02), Max: 36.9 (19 Feb 2024 02:00)  T(F): 98 (19 Feb 2024 14:02), Max: 98.4 (19 Feb 2024 02:00)  HR: 107 (19 Feb 2024 14:02) (76 - 107)  BP: 124/70 (19 Feb 2024 14:02) (97/53 - 134/81)  BP(mean): --  RR: 20 (19 Feb 2024 14:02) (18 - 21)  SpO2: 90% (19 Feb 2024 14:02) (90% - 99%)    Parameters below as of 19 Feb 2024 14:02  Patient On (Oxygen Delivery Method): nasal cannula  O2 Flow (L/min): 4      ABG - ( 19 Feb 2024 06:40 )  pH, Arterial: 7.380 pH, Blood: x     /  pCO2: 78    /  pO2: 87    / HCO3: 46    / Base Excess: 21.0  /  SaO2: 100.0               I&O's Detail        LABS:                        8.9    9.72  )-----------( 217      ( 19 Feb 2024 03:56 )             28.9     02-19    140  |  93<L>  |  42.2<H>  ----------------------------<  153<H>  4.2   |  33.0<H>  |  2.20<H>    Ca    8.6      19 Feb 2024 02:18  Phos  4.2     02-19  Mg     1.9     02-19    TPro  6.6  /  Alb  3.0<L>  /  TBili  0.4  /  DBili  x   /  AST  34  /  ALT  14  /  AlkPhos  76  02-19          CAPILLARY BLOOD GLUCOSE      POCT Blood Glucose.: 190 mg/dL (18 Feb 2024 11:25)    PT/INR - ( 18 Feb 2024 12:27 )   PT: 12.8 sec;   INR: 1.16 ratio         PTT - ( 18 Feb 2024 12:27 )  PTT:32.6 sec  Urinalysis Basic - ( 19 Feb 2024 02:18 )    Color: x / Appearance: x / SG: x / pH: x  Gluc: 153 mg/dL / Ketone: x  / Bili: x / Urobili: x   Blood: x / Protein: x / Nitrite: x   Leuk Esterase: x / RBC: x / WBC x   Sq Epi: x / Non Sq Epi: x / Bacteria: x      CULTURES:      Physical Examination:  GENERAL: In NAD   HEENT: NC/AT  NECK: Supple, trachea midline  PULM: mild wheeze bi basialr , improved   CVS: +S1, S2, RRR  ABD: Soft, non-tender  EXTREMITIES: No pedal edema B/L  SKIN: No open wounds  NEURO: Grossly non-focal    DEVICES:     RADIOLOGY:     IMPRESSION:  CHF and small bilateral pleural effusions with or without pneumonia    
                [facility]                                   [address] [place] [address]                                                      PHONE: [phone]                                                         FAX: [phone]  [email or web address]  ---------------------------------------------------------------------------------------------------------------------------------    Reason for Consult: dyspnea    HPI:  AXEL NEGRETE is an 89y Male with HTN HLD CAD s/p remote LAD.RCA PCI, 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 Currentlt on BIPAP comfortable, afebrile, CXR ? PNA.  Cardiology consult called for elevated trop.    PAST MEDICAL & SURGICAL HISTORY:      No Known Allergies      MEDICATIONS  (STANDING):  albuterol/ipratropium for Nebulization 3 milliLiter(s) Nebulizer every 6 hours  aspirin  chewable 81 milliGRAM(s) Oral daily  atorvastatin 40 milliGRAM(s) Oral at bedtime  azithromycin  IVPB 500 milliGRAM(s) IV Intermittent every 24 hours  buDESOnide    Inhalation Suspension 0.5 milliGRAM(s) Inhalation two times a day  clopidogrel Tablet 75 milliGRAM(s) Oral daily  ferrous    sulfate 325 milliGRAM(s) Oral daily  finasteride 5 milliGRAM(s) Oral daily  furosemide   Injectable 40 milliGRAM(s) IV Push two times a day  heparin   Injectable 5000 Unit(s) SubCutaneous every 8 hours  influenza  Vaccine (HIGH DOSE) 0.7 milliLiter(s) IntraMuscular once  methylPREDNISolone sodium succinate Injectable 40 milliGRAM(s) IV Push every 8 hours  propranolol 80 milliGRAM(s) Oral daily  tamsulosin 0.4 milliGRAM(s) Oral at bedtime    MEDICATIONS  (PRN):  acetaminophen     Tablet .. 650 milliGRAM(s) Oral every 6 hours PRN Temp greater or equal to 38C (100.4F), Mild Pain (1 - 3)  aluminum hydroxide/magnesium hydroxide/simethicone Suspension 30 milliLiter(s) Oral every 4 hours PRN Dyspepsia  melatonin 3 milliGRAM(s) Oral at bedtime PRN Insomnia  ondansetron Injectable 4 milliGRAM(s) IV Push every 8 hours PRN Nausea and/or Vomiting      Social History:  Cigarettes:    neg                Alchohol:         neg        Illicit Drug Abuse:  neg  neg FH    ROS: Negative other than as mentioned in HPI.    Vital Signs Last 24 Hrs  T(C): 36.9 (19 Feb 2024 07:45), Max: 36.9 (19 Feb 2024 02:00)  T(F): 98.4 (19 Feb 2024 07:45), Max: 98.4 (19 Feb 2024 02:00)  HR: 85 (19 Feb 2024 07:45) (76 - 102)  BP: 122/55 (19 Feb 2024 07:45) (109/57 - 165/97)  BP(mean): --  RR: 18 (19 Feb 2024 07:45) (18 - 21)  SpO2: 99% (19 Feb 2024 07:45) (70% - 99%)    Parameters below as of 19 Feb 2024 07:45  Patient On (Oxygen Delivery Method): BiPAP/CPAP      ICU Vital Signs Last 24 Hrs  AXEL BENOITKAYLEIGHMOHINI  I&O's Detail    I&O's Summary    Drug Dosing Weight  AXEL NEGRETE      PHYSICAL EXAM:  General: Appears alert and cooperative.  HEENT: Head; normocephalic, atraumatic.  Eyes: Pupils reactive, cornea wnl.  Neck: Supple, no nodes adenopathy, no JVD or carotid bruit or thyromegaly.  CARDIOVASCULAR: Normal S1 and S2, No murmur, rub, gallop or lift.   LUNGS: No rales, rhonchi or wheeze. Diminished BS bilat  ABDOMEN: Soft, nontender without mass or organomegaly. bowel sounds normoactive.  EXTREMITIES: No clubbing, cyanosis or edema. Distal pulses wnl.   SKIN: warm and dry with normal turgor.  NEURO: Alert/oriented x 3/normal motor exam. No pathologic reflexes.    PSYCH: normal affect.        LABS:                        8.9    9.72  )-----------( 217      ( 19 Feb 2024 03:56 )             28.9     02-19    140  |  93<L>  |  42.2<H>  ----------------------------<  153<H>  4.2   |  33.0<H>  |  2.20<H>    Ca    8.6      19 Feb 2024 02:18  Phos  4.2     02-19  Mg     1.9     02-19    TPro  6.6  /  Alb  3.0<L>  /  TBili  0.4  /  DBili  x   /  AST  34  /  ALT  14  /  AlkPhos  76  02-19    [facility]      PT/INR - ( 18 Feb 2024 12:27 )   PT: 12.8 sec;   INR: 1.16 ratio         PTT - ( 18 Feb 2024 12:27 )  PTT:32.6 sec  Urinalysis Basic - ( 19 Feb 2024 02:18 )    Color: x / Appearance: x / SG: x / pH: x  Gluc: 153 mg/dL / Ketone: x  / Bili: x / Urobili: x   Blood: x / Protein: x / Nitrite: x   Leuk Esterase: x / RBC: x / WBC x   Sq Epi: x / Non Sq Epi: x / Bacteria: x        RADIOLOGY & ADDITIONAL STUDIES:    INTERPRETATION OF TELEMETRY (personally reviewed):    ECG: NSR RBBB    ECHO: office: 9/2022: LVEF 55% mild MR, Reji AVR with normal gradients    < from: Xray Chest 1 View- PORTABLE-Urgent (Xray Chest 1 View- PORTABLE-Urgent .) (02.18.24 @ 13:21) >    ACC: [id number] EXAM:  XR CHEST PORTABLE URGENT 1V   ORDERED BY: GOLDIE KLEIN     PROCEDURE DATE:  02/18/2024        Pro-Brain Natriuretic Peptide: 89213: NT-proBNP Interpretive comments:  Acute Congestive Heart Failure is unlikely if NT-proBNP is less than 300  pg/mL, for any age.  Consider Acute Congestive Heart Failure if:  AGE               NT-proBNP Result  ___               ________________  < 50year           > 450 pg/mL  50 - 75 years     > 900 pg/mL  > 75 years         > 1800 pg/ml  All results require clinical correlation. Consider obtaining a baseline or  "dry" NT-proBNP level when the patient is stabilized, so that subsequent  levels can be related to that. Patients with recurrent CHF may have  elevated  NT-proBNP levels. Acute failure episodes generally produce levels at  least 25  % greater than base line levels. The above values are derived from a large  multi-center international study, "Javier, JL, et al, European Heart  Journal,  2006; 27:330-337. pg/mL (02.18.24 @ 12:27)      INTERPRETATION:  XR CHEST URGENT dated 2/18/2024 1:21 PM    CLINICAL INFORMATION: Male, 89 years old.  eval for pna.    PRIOR STUDIES: None    FINDINGS/  IMPRESSION: Limited evaluation of the heart size, mediastinal and hilar   contours due to the low lung volumes. There are linear bands of   atelectasis involving the mid and lower lung zones which appear more   confluent over the left lower lung zone and a developing pneumonia cannot   be excluded. No large effusions. No pneumothorax. No acute soft tissue or   osseous pathology. A short surveillance 2 view chest may be of value to   confirm resolution of a possible developing left lower lobe pneumonia.    --- End of Report ---            KALA CADE MD; Attending Radiologist  This document has been electronically signed. Feb 18 2024  3:51PM    < end of copied text >      Troponin T, High Sensitivity Result: 186: *  *  Rapid upward or downward changes in high-sensitivity troponin levels  suggest acute myocardial injury. Renal impairment may cause sustained  troponin elevations.  Normal: <6 - 14 ng/L  Indeterminate: 15-51 ng/L  Elevated: > 51 ng/L  See [email or web address]/test/[email or web address] on the [employer]comet for more  information ng/L (02.19.24 @ 02:18)

## 2024-04-17 RX ORDER — FERROUS SULFATE 325(65) MG
0 TABLET ORAL
Refills: 0 | DISCHARGE

## 2024-04-17 RX ORDER — SIMVASTATIN 20 MG/1
1 TABLET, FILM COATED ORAL
Refills: 0 | DISCHARGE

## 2024-04-17 RX ORDER — TAMSULOSIN HYDROCHLORIDE 0.4 MG/1
1 CAPSULE ORAL
Refills: 0 | DISCHARGE

## 2024-04-17 RX ORDER — ASPIRIN/CALCIUM CARB/MAGNESIUM 324 MG
0 TABLET ORAL
Refills: 0 | DISCHARGE

## 2024-04-17 RX ORDER — POTASSIUM CHLORIDE 20 MEQ
1 PACKET (EA) ORAL
Refills: 0 | DISCHARGE

## 2024-04-17 RX ORDER — FUROSEMIDE 40 MG
1 TABLET ORAL
Refills: 0 | DISCHARGE

## 2024-04-17 RX ORDER — BUDESONIDE, MICRONIZED 100 %
2 POWDER (GRAM) MISCELLANEOUS
Refills: 0 | DISCHARGE

## 2024-04-17 RX ORDER — FINASTERIDE 5 MG/1
1 TABLET, FILM COATED ORAL
Refills: 0 | DISCHARGE

## 2024-04-17 RX ORDER — PROPRANOLOL HCL 160 MG
1 CAPSULE, EXTENDED RELEASE 24HR ORAL
Refills: 0 | DISCHARGE

## 2024-04-17 RX ORDER — CLOPIDOGREL BISULFATE 75 MG/1
1 TABLET, FILM COATED ORAL
Refills: 0 | DISCHARGE

## 2025-04-11 NOTE — PATIENT PROFILE ADULT - TOBACCO USE
Comment: Exc by another provider
Detail Level: Simple
Render Risk Assessment In Note?: no
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
Never smoker